# Patient Record
Sex: FEMALE | Race: AMERICAN INDIAN OR ALASKA NATIVE
[De-identification: names, ages, dates, MRNs, and addresses within clinical notes are randomized per-mention and may not be internally consistent; named-entity substitution may affect disease eponyms.]

---

## 2019-04-04 ENCOUNTER — HOSPITAL ENCOUNTER (EMERGENCY)
Dept: HOSPITAL 5 - ED | Age: 24
LOS: 1 days | Discharge: HOME | End: 2019-04-05
Payer: COMMERCIAL
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
## 2019-04-04 DIAGNOSIS — M54.5: ICD-10-CM
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
## 2019-04-04 DIAGNOSIS — J45.909: ICD-10-CM
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
## 2019-04-04 DIAGNOSIS — M25.511: Primary | ICD-10-CM

## 2019-04-04 PROCEDURE — 72100 X-RAY EXAM L-S SPINE 2/3 VWS: CPT

## 2019-04-05 VITALS — SYSTOLIC BLOOD PRESSURE: 129 MMHG | DIASTOLIC BLOOD PRESSURE: 69 MMHG

## 2019-04-05 NOTE — XRAY REPORT
PROCEDURE: XR SPINE LUMBOSACRAL 2-3V 

 

TECHNIQUE:  3 views of lumbar spine. 

 

HISTORY: Lower back pain 

 

COMPARISONS: None available.  

 

FINDINGS: 

 

Osseous mineralization is normal. There is a mild convex left lumbar curvature. The lumbosacral junct
ion is incompletely visualized on the anteroposterior projection. 

 

Vertebral body heights appear maintained. There is no spondylolysis or spondylolisthesis noted. No hi
gh-grade intervertebral disc space narrowing or bulky endplate osteophytes. 

 

IMPRESSION:  

1. No acute osseous abnormality involving the lumbar spine. 

2. Anterior posterior projection is suboptimal, the lumbosacral junction and sacroiliac joints are ou
tside the field-of-view. 

 

This document is electronically signed by Hernan Lam DO., April 5 2019 01:15:27 AM ET

## 2019-04-05 NOTE — XRAY REPORT
PROCEDURE: XR KNEE 3V LT 

 

TECHNIQUE:  3 views of the left knee 

 

HISTORY: Left knee pain  

 

COMPARISONS: None available.  

 

FINDINGS: 

Osseous mineralization is normal. There is no acute fracture or dislocation.  

Enthesopathy at the tibial tuberosity present. No significant joint effusion. 

Joint spaces are maintained without significant degenerative change or erosive arthropathy. 

 

IMPRESSION:  

No acute osseous abnormality or significant degenerative change. 

 

This document is electronically signed by Hernan Lam DO., April 5 2019 01:16:51 AM ET

## 2019-04-05 NOTE — XRAY REPORT
PROCEDURE: XR SHOULDER 2+V RT 

 

TECHNIQUE:  3 views of the right shoulder. 

 

HISTORY: Right shoulder pain 

 

COMPARISONS: None available.  

 

FINDINGS: 

 

Normal osseous mineralization. No acute fracture or dislocation. Joint space is maintained without si
gnificant degenerative change. The acromioclavicular and coracoclavicular intervals are within normal
 limits. Right upper lung clear. 

 

IMPRESSION:  

 

No acute fracture, dislocation or significant degenerative change. 

 

This document is electronically signed by Hernan Lam DO., April 5 2019 01:42:18 AM ET
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
